# Patient Record
Sex: FEMALE | URBAN - METROPOLITAN AREA
[De-identification: names, ages, dates, MRNs, and addresses within clinical notes are randomized per-mention and may not be internally consistent; named-entity substitution may affect disease eponyms.]

---

## 2021-03-01 ENCOUNTER — OFFICE VISIT (OUTPATIENT)
Dept: URBAN - METROPOLITAN AREA CLINIC 88 | Facility: CLINIC | Age: 66
End: 2021-03-01
Payer: MEDICARE

## 2021-03-01 DIAGNOSIS — H02.839 DERMATOCHALASIS OF UNSPECIFIED EYE, UNSPECIFIED EYELID: ICD-10-CM

## 2021-03-01 DIAGNOSIS — E11.9 TYPE 2 DIABETES MELLITUS WITHOUT COMPLICATIONS: Primary | ICD-10-CM

## 2021-03-01 DIAGNOSIS — H25.813 COMBINED FORMS OF AGE-RELATED CATARACT, BILATERAL: ICD-10-CM

## 2021-03-01 PROCEDURE — 99203 OFFICE O/P NEW LOW 30 MIN: CPT | Performed by: OPHTHALMOLOGY

## 2021-03-01 ASSESSMENT — INTRAOCULAR PRESSURE
OD: 17
OS: 17

## 2021-03-01 ASSESSMENT — KERATOMETRY
OS: 42.75
OD: 43.25

## 2021-03-01 ASSESSMENT — VISUAL ACUITY
OS: 20/30
OD: 20/40

## 2021-03-01 NOTE — IMPRESSION/PLAN
Impression: Type 2 diabetes mellitus without complications: O15.2. Condition: stable. Plan: Diabetes type II: no background retinopathy, no signs of neovascularization noted. Discussed ocular and systemic benefits of blood sugar control.

## 2021-03-01 NOTE — IMPRESSION/PLAN
Impression: Dermatochalasis of unspecified eye, unspecified eyelid: H02.839. Condition: stable. Plan: Advised patient of condition. No treatment is required at this time.